# Patient Record
Sex: FEMALE | Race: WHITE | NOT HISPANIC OR LATINO | Employment: UNEMPLOYED | URBAN - METROPOLITAN AREA
[De-identification: names, ages, dates, MRNs, and addresses within clinical notes are randomized per-mention and may not be internally consistent; named-entity substitution may affect disease eponyms.]

---

## 2018-04-30 ENCOUNTER — LAB REQUISITION (OUTPATIENT)
Dept: LAB | Facility: HOSPITAL | Age: 23
End: 2018-04-30

## 2018-04-30 DIAGNOSIS — Z00.00 ROUTINE GENERAL MEDICAL EXAMINATION AT A HEALTH CARE FACILITY: ICD-10-CM

## 2018-04-30 PROCEDURE — 36415 COLL VENOUS BLD VENIPUNCTURE: CPT | Performed by: NURSE PRACTITIONER

## 2018-05-08 ENCOUNTER — LAB REQUISITION (OUTPATIENT)
Dept: LAB | Facility: HOSPITAL | Age: 23
End: 2018-05-08

## 2018-05-08 DIAGNOSIS — Z00.00 ROUTINE GENERAL MEDICAL EXAMINATION AT A HEALTH CARE FACILITY: ICD-10-CM

## 2018-05-08 PROCEDURE — 36415 COLL VENOUS BLD VENIPUNCTURE: CPT | Performed by: OBSTETRICS & GYNECOLOGY

## 2018-10-10 ENCOUNTER — LAB REQUISITION (OUTPATIENT)
Dept: LAB | Facility: HOSPITAL | Age: 23
End: 2018-10-10

## 2018-10-10 DIAGNOSIS — Z00.00 ROUTINE GENERAL MEDICAL EXAMINATION AT A HEALTH CARE FACILITY: ICD-10-CM

## 2018-10-10 PROCEDURE — 36415 COLL VENOUS BLD VENIPUNCTURE: CPT | Performed by: NURSE PRACTITIONER

## 2019-08-02 ENCOUNTER — HOSPITAL ENCOUNTER (EMERGENCY)
Facility: HOSPITAL | Age: 24
Discharge: HOME | End: 2019-08-02
Attending: EMERGENCY MEDICINE | Admitting: EMERGENCY MEDICINE

## 2019-08-02 VITALS
RESPIRATION RATE: 18 BRPM | OXYGEN SATURATION: 94 % | BODY MASS INDEX: 35.49 KG/M2 | HEIGHT: 72 IN | WEIGHT: 262 LBS | SYSTOLIC BLOOD PRESSURE: 123 MMHG | TEMPERATURE: 98.4 F | DIASTOLIC BLOOD PRESSURE: 66 MMHG | HEART RATE: 90 BPM

## 2019-08-02 DIAGNOSIS — N93.9 ABNORMAL VAGINAL BLEEDING: Primary | ICD-10-CM

## 2019-08-02 LAB
ALBUMIN SERPL-MCNC: 4.1 G/DL (ref 3.5–5.2)
ALBUMIN/GLOB SERPL: 1.3 G/DL
ALP SERPL-CCNC: 68 U/L (ref 39–117)
ALT SERPL W P-5'-P-CCNC: 28 U/L (ref 1–33)
ANION GAP SERPL CALCULATED.3IONS-SCNC: 14 MMOL/L (ref 5–15)
AST SERPL-CCNC: 20 U/L (ref 1–32)
B-HCG UR QL: NEGATIVE
BACTERIA UR QL AUTO: ABNORMAL /HPF
BASOPHILS # BLD AUTO: 0.02 10*3/MM3 (ref 0–0.2)
BASOPHILS NFR BLD AUTO: 0.3 % (ref 0–1.5)
BILIRUB SERPL-MCNC: 0.6 MG/DL (ref 0.2–1.2)
BILIRUB UR QL STRIP: NEGATIVE
BUN BLD-MCNC: 11 MG/DL (ref 6–20)
BUN/CREAT SERPL: 15.1 (ref 7–25)
CALCIUM SPEC-SCNC: 9.7 MG/DL (ref 8.6–10.5)
CHLORIDE SERPL-SCNC: 103 MMOL/L (ref 98–107)
CLARITY UR: CLEAR
CO2 SERPL-SCNC: 27 MMOL/L (ref 22–29)
COLOR UR: YELLOW
CREAT BLD-MCNC: 0.73 MG/DL (ref 0.57–1)
DEPRECATED RDW RBC AUTO: 40.5 FL (ref 37–54)
EOSINOPHIL # BLD AUTO: 0.09 10*3/MM3 (ref 0–0.4)
EOSINOPHIL NFR BLD AUTO: 1.2 % (ref 0.3–6.2)
ERYTHROCYTE [DISTWIDTH] IN BLOOD BY AUTOMATED COUNT: 13.2 % (ref 12.3–15.4)
GFR SERPL CREATININE-BSD FRML MDRD: 97 ML/MIN/1.73
GLOBULIN UR ELPH-MCNC: 3.1 GM/DL
GLUCOSE BLD-MCNC: 118 MG/DL (ref 65–99)
GLUCOSE UR STRIP-MCNC: NEGATIVE MG/DL
HCT VFR BLD AUTO: 41.9 % (ref 34–46.6)
HGB BLD-MCNC: 13.3 G/DL (ref 12–15.9)
HGB UR QL STRIP.AUTO: ABNORMAL
HOLD SPECIMEN: NORMAL
HOLD SPECIMEN: NORMAL
HYALINE CASTS UR QL AUTO: ABNORMAL /LPF
IMM GRANULOCYTES # BLD AUTO: 0.03 10*3/MM3 (ref 0–0.05)
IMM GRANULOCYTES NFR BLD AUTO: 0.4 % (ref 0–0.5)
INTERNAL NEGATIVE CONTROL: NEGATIVE
INTERNAL POSITIVE CONTROL: POSITIVE
KETONES UR QL STRIP: NEGATIVE
LEUKOCYTE ESTERASE UR QL STRIP.AUTO: NEGATIVE
LYMPHOCYTES # BLD AUTO: 2.91 10*3/MM3 (ref 0.7–3.1)
LYMPHOCYTES NFR BLD AUTO: 39.9 % (ref 19.6–45.3)
Lab: NORMAL
MCH RBC QN AUTO: 26.9 PG (ref 26.6–33)
MCHC RBC AUTO-ENTMCNC: 31.7 G/DL (ref 31.5–35.7)
MCV RBC AUTO: 84.8 FL (ref 79–97)
MONOCYTES # BLD AUTO: 0.58 10*3/MM3 (ref 0.1–0.9)
MONOCYTES NFR BLD AUTO: 8 % (ref 5–12)
NEUTROPHILS # BLD AUTO: 3.66 10*3/MM3 (ref 1.7–7)
NEUTROPHILS NFR BLD AUTO: 50.2 % (ref 42.7–76)
NITRITE UR QL STRIP: NEGATIVE
NRBC BLD AUTO-RTO: 0 /100 WBC (ref 0–0.2)
PH UR STRIP.AUTO: 6 [PH] (ref 5–8)
PLATELET # BLD AUTO: 233 10*3/MM3 (ref 140–450)
PMV BLD AUTO: 10.4 FL (ref 6–12)
POTASSIUM BLD-SCNC: 3.7 MMOL/L (ref 3.5–5.2)
PROT SERPL-MCNC: 7.2 G/DL (ref 6–8.5)
PROT UR QL STRIP: ABNORMAL
RBC # BLD AUTO: 4.94 10*6/MM3 (ref 3.77–5.28)
RBC # UR: ABNORMAL /HPF
REF LAB TEST METHOD: ABNORMAL
SODIUM BLD-SCNC: 144 MMOL/L (ref 136–145)
SP GR UR STRIP: 1.02 (ref 1–1.03)
SQUAMOUS #/AREA URNS HPF: ABNORMAL /HPF
UROBILINOGEN UR QL STRIP: ABNORMAL
WBC NRBC COR # BLD: 7.29 10*3/MM3 (ref 3.4–10.8)
WBC UR QL AUTO: ABNORMAL /HPF
WHOLE BLOOD HOLD SPECIMEN: NORMAL
WHOLE BLOOD HOLD SPECIMEN: NORMAL

## 2019-08-02 PROCEDURE — 80053 COMPREHEN METABOLIC PANEL: CPT | Performed by: PHYSICIAN ASSISTANT

## 2019-08-02 PROCEDURE — 85025 COMPLETE CBC W/AUTO DIFF WBC: CPT | Performed by: PHYSICIAN ASSISTANT

## 2019-08-02 PROCEDURE — 99284 EMERGENCY DEPT VISIT MOD MDM: CPT

## 2019-08-02 PROCEDURE — 81001 URINALYSIS AUTO W/SCOPE: CPT | Performed by: PHYSICIAN ASSISTANT

## 2019-08-02 PROCEDURE — 81025 URINE PREGNANCY TEST: CPT | Performed by: PHYSICIAN ASSISTANT

## 2019-08-02 NOTE — ED PROVIDER NOTES
Subjective   Syd Davalos is a 24 y.o. female who presents to the ED with complaints of vaginal bleeding. The patient reports that she had a vaginal delivery of her second child on 12/8/18. She states that she experienced vaginal bleeding shortly after the surgery, but did not start bleeding again until 2 months ago. She states that she stopped breast feeding approximately 3-4 months ago. She reports that her vaginal bleeding has worsened over the past few months and last week she began to experience clots. She states that the clots have gradually been increasing in size. She notes that this morning she woke up and she had bled through her tampon, her pants, and onto her bed. She states that she had passed a clot that was tangerine sized. She reports that she goes through approximately 1 tampon an hour. She also complains of nausea, vomiting, weakness, and occasional abdominal pain. She denies dizziness, syncope, and dysuria. She has an appointment scheduled with her obgyn on 8/8. She is not taking birth control and is sexually active. She has a history of asthma. She smokes tobacco. There are no other acute complaints at this time.         History provided by:  Patient  Vaginal Bleeding   Severity:  Moderate  Onset quality:  Gradual  Timing:  Constant  Progression:  Worsening  Chronicity:  New  Associated symptoms: abdominal pain and nausea    Associated symptoms: no back pain, no dizziness, no dysuria and no fever        Review of Systems   Constitutional: Negative for chills and fever.   HENT: Negative for congestion, ear pain, sore throat and trouble swallowing.    Eyes: Negative for pain, redness and visual disturbance.   Respiratory: Negative for cough, chest tightness and shortness of breath.    Cardiovascular: Negative for chest pain and leg swelling.   Gastrointestinal: Positive for abdominal pain, nausea and vomiting. Negative for constipation and diarrhea.   Genitourinary: Positive for vaginal bleeding.  Negative for difficulty urinating, dysuria, flank pain and hematuria.   Musculoskeletal: Negative for arthralgias, back pain and joint swelling.   Skin: Negative for rash and wound.   Neurological: Positive for weakness. Negative for dizziness, syncope, speech difficulty, numbness and headaches.   Psychiatric/Behavioral: Negative for confusion.   All other systems reviewed and are negative.      Past Medical History:   Diagnosis Date   • Asthma        No Known Allergies    History reviewed. No pertinent surgical history.    History reviewed. No pertinent family history.    Social History     Socioeconomic History   • Marital status: Single     Spouse name: Not on file   • Number of children: Not on file   • Years of education: Not on file   • Highest education level: Not on file   Tobacco Use   • Smoking status: Current Every Day Smoker   Substance and Sexual Activity   • Alcohol use: No   • Drug use: Yes     Types: Marijuana   • Sexual activity: Yes     Partners: Male     Birth control/protection: None         Objective   Physical Exam   Constitutional: She is oriented to person, place, and time. She appears well-developed and well-nourished. No distress.   HENT:   Head: Normocephalic and atraumatic.   Eyes: Conjunctivae are normal. No scleral icterus.   Neck: Normal range of motion. Neck supple.   Cardiovascular: Normal rate, regular rhythm and normal heart sounds.   Pulmonary/Chest: Effort normal and breath sounds normal.   Abdominal: Soft. There is tenderness. There is no rebound and no guarding.   Mild diffuse lower abdominal tenderness.    Genitourinary: There is bleeding (mild) in the vagina. No erythema or tenderness in the vagina. No vaginal discharge found.   Genitourinary Comments: Nurse chaperone present.    Musculoskeletal: Normal range of motion.   Neurological: She is alert and oriented to person, place, and time.   Skin: Skin is warm and dry.   Psychiatric: She has a normal mood and affect. Her  behavior is normal.   Nursing note and vitals reviewed.      Procedures         ED Course     Discussed results with patient and tx plan. Labs WNL, patient is not anemic and vitals stable. Will dc home and have her f/u with OB as scheduled.      Recent Results (from the past 24 hour(s))   Light Blue Top    Collection Time: 08/02/19  2:40 PM   Result Value Ref Range    Extra Tube hold for add-on    Green Top (Gel)    Collection Time: 08/02/19  2:40 PM   Result Value Ref Range    Extra Tube Hold for add-ons.    Lavender Top    Collection Time: 08/02/19  2:40 PM   Result Value Ref Range    Extra Tube hold for add-on    Gold Top - SST    Collection Time: 08/02/19  2:40 PM   Result Value Ref Range    Extra Tube Hold for add-ons.    Comprehensive Metabolic Panel    Collection Time: 08/02/19  2:40 PM   Result Value Ref Range    Glucose 118 (H) 65 - 99 mg/dL    BUN 11 6 - 20 mg/dL    Creatinine 0.73 0.57 - 1.00 mg/dL    Sodium 144 136 - 145 mmol/L    Potassium 3.7 3.5 - 5.2 mmol/L    Chloride 103 98 - 107 mmol/L    CO2 27.0 22.0 - 29.0 mmol/L    Calcium 9.7 8.6 - 10.5 mg/dL    Total Protein 7.2 6.0 - 8.5 g/dL    Albumin 4.10 3.50 - 5.20 g/dL    ALT (SGPT) 28 1 - 33 U/L    AST (SGOT) 20 1 - 32 U/L    Alkaline Phosphatase 68 39 - 117 U/L    Total Bilirubin 0.6 0.2 - 1.2 mg/dL    eGFR Non African Amer 97 >60 mL/min/1.73    Globulin 3.1 gm/dL    A/G Ratio 1.3 g/dL    BUN/Creatinine Ratio 15.1 7.0 - 25.0    Anion Gap 14.0 5.0 - 15.0 mmol/L   CBC Auto Differential    Collection Time: 08/02/19  2:40 PM   Result Value Ref Range    WBC 7.29 3.40 - 10.80 10*3/mm3    RBC 4.94 3.77 - 5.28 10*6/mm3    Hemoglobin 13.3 12.0 - 15.9 g/dL    Hematocrit 41.9 34.0 - 46.6 %    MCV 84.8 79.0 - 97.0 fL    MCH 26.9 26.6 - 33.0 pg    MCHC 31.7 31.5 - 35.7 g/dL    RDW 13.2 12.3 - 15.4 %    RDW-SD 40.5 37.0 - 54.0 fl    MPV 10.4 6.0 - 12.0 fL    Platelets 233 140 - 450 10*3/mm3    Neutrophil % 50.2 42.7 - 76.0 %    Lymphocyte % 39.9 19.6 - 45.3 %     Monocyte % 8.0 5.0 - 12.0 %    Eosinophil % 1.2 0.3 - 6.2 %    Basophil % 0.3 0.0 - 1.5 %    Immature Grans % 0.4 0.0 - 0.5 %    Neutrophils, Absolute 3.66 1.70 - 7.00 10*3/mm3    Lymphocytes, Absolute 2.91 0.70 - 3.10 10*3/mm3    Monocytes, Absolute 0.58 0.10 - 0.90 10*3/mm3    Eosinophils, Absolute 0.09 0.00 - 0.40 10*3/mm3    Basophils, Absolute 0.02 0.00 - 0.20 10*3/mm3    Immature Grans, Absolute 0.03 0.00 - 0.05 10*3/mm3    nRBC 0.0 0.0 - 0.2 /100 WBC   Urinalysis With Microscopic If Indicated (No Culture) - Urine, Clean Catch    Collection Time: 08/02/19  3:19 PM   Result Value Ref Range    Color, UA Yellow Yellow, Straw    Appearance, UA Clear Clear    pH, UA 6.0 5.0 - 8.0    Specific Gravity, UA 1.022 1.001 - 1.030    Glucose, UA Negative Negative    Ketones, UA Negative Negative    Bilirubin, UA Negative Negative    Blood, UA Moderate (2+) (A) Negative    Protein, UA 30 mg/dL (1+) (A) Negative    Leuk Esterase, UA Negative Negative    Nitrite, UA Negative Negative    Urobilinogen, UA 1.0 E.U./dL 0.2 - 1.0 E.U./dL   Urinalysis, Microscopic Only - Urine, Clean Catch    Collection Time: 08/02/19  3:19 PM   Result Value Ref Range    RBC, UA 21-30 (A) None Seen, 0-2 /HPF    WBC, UA 0-2 None Seen, 0-2 /HPF    Bacteria, UA None Seen None Seen, Trace /HPF    Squamous Epithelial Cells, UA 0-2 None Seen, 0-2 /HPF    Hyaline Casts, UA 0-6 0 - 6 /LPF    Methodology Automated Microscopy    POCT Pregnancy, Urine    Collection Time: 08/02/19  3:20 PM   Result Value Ref Range    HCG, Urine, QL Negative Negative    Lot Number [id number]     Internal Positive Control Positive     Internal Negative Control Negative      Note: In addition to lab results from this visit, the labs listed above may include labs taken at another facility or during a different encounter within the last 24 hours. Please correlate lab times with ED admission and discharge times for further clarification of the services performed during this  "visit.    No orders to display     Vitals:    08/02/19 1412   BP: 123/66   BP Location: Left arm   Patient Position: Sitting   Pulse: 90   Resp: 18   Temp: 98.4 °F (36.9 °C)   TempSrc: Oral   SpO2: 94%   Weight: 119 kg (262 lb)   Height: 182.9 cm (72\")     Medications - No data to display  ECG/EMG Results (last 24 hours)     ** No results found for the last 24 hours. **        No orders to display                     MDM    Final diagnoses:   Abnormal vaginal bleeding       Documentation assistance provided by andrews Pascal.  Information recorded by the andrews was done at my direction and has been verified and validated by me.     Vane Pascal  08/02/19 1743       Vane Pascal  08/02/19 1743       Sharlene Arredondo PA  08/02/19 0477    "

## 2019-10-21 ENCOUNTER — OFFICE VISIT (OUTPATIENT)
Dept: FAMILY MEDICINE CLINIC | Facility: CLINIC | Age: 24
End: 2019-10-21

## 2019-10-21 VITALS
SYSTOLIC BLOOD PRESSURE: 124 MMHG | HEART RATE: 89 BPM | TEMPERATURE: 97.3 F | DIASTOLIC BLOOD PRESSURE: 62 MMHG | WEIGHT: 253 LBS | OXYGEN SATURATION: 97 % | HEIGHT: 72 IN | BODY MASS INDEX: 34.27 KG/M2 | RESPIRATION RATE: 16 BRPM

## 2019-10-21 DIAGNOSIS — K21.9 GERD WITHOUT ESOPHAGITIS: ICD-10-CM

## 2019-10-21 DIAGNOSIS — K52.9 CHRONIC DIARRHEA: ICD-10-CM

## 2019-10-21 DIAGNOSIS — Z83.3 FAMILY HISTORY OF DIABETES MELLITUS: ICD-10-CM

## 2019-10-21 DIAGNOSIS — J45.20 MILD INTERMITTENT ASTHMA WITHOUT COMPLICATION: Primary | ICD-10-CM

## 2019-10-21 DIAGNOSIS — F17.200 CURRENT SMOKER: ICD-10-CM

## 2019-10-21 PROCEDURE — 99406 BEHAV CHNG SMOKING 3-10 MIN: CPT | Performed by: FAMILY MEDICINE

## 2019-10-21 PROCEDURE — 99204 OFFICE O/P NEW MOD 45 MIN: CPT | Performed by: FAMILY MEDICINE

## 2019-10-21 RX ORDER — FAMOTIDINE 20 MG/1
20 TABLET, FILM COATED ORAL 2 TIMES DAILY PRN
Qty: 180 TABLET | Refills: 1 | Status: SHIPPED | OUTPATIENT
Start: 2019-10-21 | End: 2020-04-16 | Stop reason: SDUPTHER

## 2019-10-21 RX ORDER — ALBUTEROL SULFATE 90 UG/1
2 AEROSOL, METERED RESPIRATORY (INHALATION) EVERY 6 HOURS PRN
Qty: 1 G | Refills: 6 | Status: SHIPPED | OUTPATIENT
Start: 2019-10-21 | End: 2020-05-27

## 2019-10-21 RX ORDER — BUPROPION HYDROCHLORIDE 150 MG/1
TABLET, EXTENDED RELEASE ORAL
Qty: 180 TABLET | Refills: 1 | Status: SHIPPED | OUTPATIENT
Start: 2019-10-21

## 2019-10-21 RX ORDER — DICYCLOMINE HCL 20 MG
20 TABLET ORAL EVERY 6 HOURS
Qty: 120 TABLET | Refills: 1 | Status: SHIPPED | OUTPATIENT
Start: 2019-10-21 | End: 2019-12-16 | Stop reason: SDUPTHER

## 2019-10-21 NOTE — PATIENT INSTRUCTIONS
Go to the nearest ER or return to clinic if symptoms worsen, fever/chill develop    Diarrhea, Adult  Diarrhea is when you have loose and water poop (stool) often. Diarrhea can make you feel weak and cause you to get dehydrated. Dehydration can make you tired and thirsty, make you have a dry mouth, and make it so you pee (urinate) less often. Diarrhea often lasts 2-3 days. However, it can last longer if it is a sign of something more serious. It is important to treat your diarrhea as told by your doctor.  Follow these instructions at home:  Eating and drinking  Follow these recommendations as told by your doctor:  · Take an oral rehydration solution (ORS). This is a drink that is sold at pharmacies and stores.  · Drink clear fluids, such as:  ? Water.  ? Ice chips.  ? Diluted fruit juice.  ? Low-calorie sports drinks.  · Eat bland, easy-to-digest foods in small amounts as you are able. These foods include:  ? Bananas.  ? Applesauce.  ? Rice.  ? Low-fat (lean) meats.  ? Toast.  ? Crackers.  · Avoid drinking fluids that have a lot of sugar or caffeine in them.  · Avoid alcohol.  · Avoid spicy or fatty foods.    General instructions  · Drink enough fluid to keep your pee (urine) clear or pale yellow.  · Wash your hands often. If you cannot use soap and water, use hand .  · Make sure that all people in your home wash their hands well and often.  · Take over-the-counter and prescription medicines only as told by your doctor.  · Rest at home while you get better.  · Watch your condition for any changes.  · Take a warm bath to help with any burning or pain from having diarrhea.  · Keep all follow-up visits as told by your doctor. This is important.  Contact a doctor if:  · You have a fever.  · Your diarrhea gets worse.  · You have new symptoms.  · You cannot keep fluids down.  · You feel light-headed or dizzy.  · You have a headache.  · You have muscle cramps.  Get help right away if:  · You have chest pain.  · You  feel very weak or you pass out (faint).  · You have bloody or black poop or poop that look like tar.  · You have very bad pain, cramping, or bloating in your belly (abdomen).  · You have trouble breathing or you are breathing very quickly.  · Your heart is beating very quickly.  · Your skin feels cold and clammy.  · You feel confused.  · You have signs of dehydration, such as:  ? Dark pee, hardly any pee, or no pee.  ? Cracked lips.  ? Dry mouth.  ? Sunken eyes.  ? Sleepiness.  ? Weakness.  This information is not intended to replace advice given to you by your health care provider. Make sure you discuss any questions you have with your health care provider.  Document Released: 06/05/2009 Document Revised: 08/22/2018 Document Reviewed: 08/23/2016  [employer] Interactive Patient Education © 2019 [employer]

## 2019-10-21 NOTE — PROGRESS NOTES
Subjective   Syd Davalos is a 24 y.o. female.   Chief Complaint   Patient presents with   • Establish Care   • Heartburn   • Diarrhea     History of Present Illness   Here to establish care  No recent primary care.     She does have asthma and smokes tobacco.   Previously treated with albuterol prn, but doesn't have an active inhaler.   Asthma has worsened with season change. Chest tightness and wheezing present.   Currently smokes <1/2 ppd, but can't seem to completely quit. She is interested in quitting.     Diarrhea present for years. Diarrhea is watery, no solid form.   No specific food seems to trigger symptoms.   No abdominal pain associated with symptoms.   Diarrhea occurs as soon as she wakes up in the morning.   Tried probiotics for months without improvement.    GERD  Patient complains of dyspepsia. Symptoms have been present for approximately for 1.5 year. Symptoms include belching, difficulty swallowing, heartburn and need to clear throat frequently. The patient denies chest pain and melena. Symptoms appear to be worsened by caffeine, carbonated beverages and citrus juices. Risk factors present for GERD include tobacco abuse, caffeine use and obesity. Risk factors absent for GERD are NSAID use. Studies performed so far include none. Treatments tried so far include chewable antacid. Results of treatment: some improvement in symptom severity. Currently, the symptoms are moderate and occur approximately 1 times per day.     She worried about DM, family history.  Also, admits to poor diet.     The following portions of the patient's history were reviewed and updated as appropriate: allergies, current medications, past family history, past medical history, past social history, past surgical history and problem list.    Review of Systems   Constitutional: Negative for chills and fever.   HENT: Negative.    Eyes: Negative.    Respiratory: Positive for chest tightness and wheezing. Negative for cough and  shortness of breath.    Cardiovascular: Negative for chest pain and palpitations.   Gastrointestinal: Positive for diarrhea, nausea and GERD. Negative for abdominal distention, abdominal pain, blood in stool and vomiting.   Endocrine: Negative for cold intolerance and heat intolerance.   Skin: Negative for rash.   Neurological: Negative for light-headedness and headache.   Hematological: Negative for adenopathy.   Psychiatric/Behavioral: Negative.        Objective   Physical Exam   Constitutional: She is oriented to person, place, and time. She appears well-developed and well-nourished.   HENT:   Head: Normocephalic and atraumatic.   Right Ear: External ear normal.   Left Ear: External ear normal.   Nose: Nose normal.   Eyes: Conjunctivae are normal.   Neck: Neck supple.   Cardiovascular: Normal rate, regular rhythm and normal heart sounds.   No murmur heard.  Pulmonary/Chest: Effort normal. No respiratory distress. She has wheezes (late expiratory) in the right upper field and the left upper field. She has no rhonchi.   Abdominal: Soft. Bowel sounds are normal. There is no tenderness. There is no guarding.   Musculoskeletal: She exhibits no edema.   Lymphadenopathy:     She has no cervical adenopathy.   Neurological: She is alert and oriented to person, place, and time.   Skin: Skin is warm and dry.   Psychiatric: She has a normal mood and affect. Her behavior is normal. Thought content normal.   Nursing note and vitals reviewed.        Assessment/Plan   Syd was seen today for establish care, heartburn and diarrhea.    Diagnoses and all orders for this visit:    Mild intermittent asthma without complication  -     albuterol sulfate  (90 Base) MCG/ACT inhaler; Inhale 2 puffs Every 6 (Six) Hours As Needed for Wheezing or Shortness of Air.  -     fluticasone-salmeterol (ADVAIR) 250-50 MCG/DOSE DISKUS; Inhale 1 puff 2 (Two) Times a Day.  -     CBC & Differential  -     Comprehensive Metabolic Panel  -     TSH  Rfx On Abnormal To Free T4    Current smoker  -     buPROPion SR (WELLBUTRIN SR) 150 MG 12 hr tablet; Take 1 tab po qd x 3 days, then increase to 1 tab po BID  -     CBC & Differential  -     Comprehensive Metabolic Panel  -     TSH Rfx On Abnormal To Free T4    Chronic diarrhea  -     CBC & Differential  -     Comprehensive Metabolic Panel  -     TSH Rfx On Abnormal To Free T4  -     Celiac Comprehensive Panel  -     dicyclomine (BENTYL) 20 MG tablet; Take 1 tablet by mouth Every 6 (Six) Hours.    GERD without esophagitis  -     famotidine (PEPCID) 20 MG tablet; Take 1 tablet by mouth 2 (Two) Times a Day As Needed for Indigestion or Heartburn.    Family history of diabetes mellitus  -     Hemoglobin A1c      She will start Advair to control asthma, albuterol inhaler prn.   She is interested in tobacco cessation. I advised Syd of the risks of continuing to use tobacco. During this visit, I spent 4 minutes counseling the patient regarding tobacco cessation.  Start bupropion first. Advised her to not start all medications at same time, in case side effects occur. Separate bupropion, famotidine, and dicyclomine by 1 week a piece.   Return to complete labs.

## 2019-11-22 ENCOUNTER — TELEPHONE (OUTPATIENT)
Dept: FAMILY MEDICINE CLINIC | Facility: CLINIC | Age: 24
End: 2019-11-22

## 2019-11-22 RX ORDER — CIPROFLOXACIN HYDROCHLORIDE 3.5 MG/ML
SOLUTION/ DROPS TOPICAL
Qty: 5 ML | Refills: 0 | Status: SHIPPED | OUTPATIENT
Start: 2019-11-22

## 2019-11-22 NOTE — TELEPHONE ENCOUNTER
PT  CALLED  STATES SHE HAS PINK EYE DUE TO THERE IS A 4YR OLD CHILD IN HER HOME  THAT HAS BEEN DIAGNOSED WITH IT  SHE WOULD LIKE SOME PINK EYE MEDS CALLED IN TO [facility]     PLEASE ADVISE AND GIVE CALL BACK

## 2019-11-22 NOTE — TELEPHONE ENCOUNTER
Antibiotic eyedrops sent to pharmacy.  Encouraged her to use good hand hygiene to prevent spread.  If symptoms worsen over the weekend, recommend that she go to urgent treatment center or ER for further evaluation

## 2019-12-16 DIAGNOSIS — K52.9 CHRONIC DIARRHEA: ICD-10-CM

## 2019-12-16 RX ORDER — DICYCLOMINE HCL 20 MG
TABLET ORAL
Qty: 120 TABLET | Refills: 1 | Status: SHIPPED | OUTPATIENT
Start: 2019-12-16

## 2020-04-16 DIAGNOSIS — J45.20 MILD INTERMITTENT ASTHMA WITHOUT COMPLICATION: ICD-10-CM

## 2020-04-16 RX ORDER — FAMOTIDINE 40 MG/1
TABLET, FILM COATED ORAL
Qty: 30 TABLET | Refills: 1 | Status: SHIPPED | OUTPATIENT
Start: 2020-04-16 | End: 2020-06-26

## 2020-05-27 DIAGNOSIS — J45.20 MILD INTERMITTENT ASTHMA WITHOUT COMPLICATION: ICD-10-CM

## 2020-05-27 RX ORDER — ALBUTEROL SULFATE 90 UG/1
2 AEROSOL, METERED RESPIRATORY (INHALATION) EVERY 6 HOURS PRN
Qty: 8.5 G | Refills: 2 | Status: SHIPPED | OUTPATIENT
Start: 2020-05-27 | End: 2020-09-11

## 2020-06-26 RX ORDER — FAMOTIDINE 40 MG/1
TABLET, FILM COATED ORAL
Qty: 30 TABLET | Refills: 0 | Status: SHIPPED | OUTPATIENT
Start: 2020-06-26 | End: 2020-08-18

## 2020-08-18 RX ORDER — FAMOTIDINE 40 MG/1
TABLET, FILM COATED ORAL
Qty: 30 TABLET | Refills: 0 | Status: SHIPPED | OUTPATIENT
Start: 2020-08-18

## 2020-09-11 DIAGNOSIS — J45.20 MILD INTERMITTENT ASTHMA WITHOUT COMPLICATION: ICD-10-CM

## 2020-09-11 RX ORDER — ALBUTEROL SULFATE 90 UG/1
2 AEROSOL, METERED RESPIRATORY (INHALATION) EVERY 6 HOURS PRN
Qty: 8.5 G | Refills: 0 | Status: SHIPPED | OUTPATIENT
Start: 2020-09-11

## 2020-09-11 NOTE — TELEPHONE ENCOUNTER
Patient has not been seen since 10/21/2019, return appointment was supposed to be completed on 12/02/2019. Please advise on refill.

## 2020-09-25 RX ORDER — FAMOTIDINE 40 MG/1
TABLET, FILM COATED ORAL
Qty: 30 TABLET | Refills: 0 | OUTPATIENT
Start: 2020-09-25

## 2020-10-22 DIAGNOSIS — J45.20 MILD INTERMITTENT ASTHMA WITHOUT COMPLICATION: ICD-10-CM

## 2020-12-31 ENCOUNTER — OFFICE VISIT (OUTPATIENT)
Dept: FAMILY MEDICINE CLINIC | Facility: CLINIC | Age: 25
End: 2020-12-31

## 2020-12-31 VITALS
RESPIRATION RATE: 16 BRPM | TEMPERATURE: 97.5 F | OXYGEN SATURATION: 99 % | SYSTOLIC BLOOD PRESSURE: 122 MMHG | HEART RATE: 95 BPM | DIASTOLIC BLOOD PRESSURE: 70 MMHG | BODY MASS INDEX: 35.67 KG/M2 | WEIGHT: 263 LBS

## 2020-12-31 DIAGNOSIS — J02.9 PHARYNGITIS, UNSPECIFIED ETIOLOGY: Primary | ICD-10-CM

## 2020-12-31 LAB
EXPIRATION DATE: NORMAL
INTERNAL CONTROL: NORMAL
Lab: NORMAL
S PYO AG THROAT QL: NEGATIVE

## 2020-12-31 PROCEDURE — 87880 STREP A ASSAY W/OPTIC: CPT | Performed by: FAMILY MEDICINE

## 2020-12-31 PROCEDURE — 99213 OFFICE O/P EST LOW 20 MIN: CPT | Performed by: FAMILY MEDICINE

## 2020-12-31 RX ORDER — HYDROXYZINE PAMOATE 50 MG/1
50 CAPSULE ORAL 3 TIMES DAILY PRN
COMMUNITY

## 2020-12-31 NOTE — PROGRESS NOTES
Assessment/Plan       Diagnoses and all orders for this visit:    1. Pharyngitis, unspecified etiology (Primary)  -     COVID-19,[facility] ROUTINE, NP/OP SWAB IN TRANSPORT MEDIA OR ESWAB 72 HR TAT - Swab, Nasopharynx  -     POCT rapid strep A           Follow up: No follow-ups on file.     DISCUSSION  Sore throat.  Needs further clearance for surgery.  Check strep test and Covid testing.    Symptomatic relief if negative.    Addendum: The strep test was negative.  Covid pending.          MEDICATIONS PRESCRIBED  Requested Prescriptions      No prescriptions requested or ordered in this encounter          -------------------------------------------    Subjective     Chief Complaint   Patient presents with   • Sore Throat   • Diarrhea         Sore Throat   This is a new problem. The current episode started yesterday. The problem has been unchanged. There has been no fever. Associated symptoms include congestion (mild) and diarrhea. Pertinent negatives include no headaches or shortness of breath. She has had no exposure to strep or mono. She has tried nothing for the symptoms.     + taste and smell    Some mucus      Nop fever    Some cough but smokes and has asthma    Was scheduled for surg yesterday at noon  Woke up with a sore throat, Severe  H/o     R/s surg. Need plate and screw in hand, right hand fractured  No date        Social History     Tobacco Use   Smoking Status Current Every Day Smoker          Past Medical History,Medications, Allergies, and social history was reviewed.          Review of Systems   Constitutional: Positive for fever.   HENT: Positive for congestion (mild) and sore throat.    Respiratory: Negative for shortness of breath.    Gastrointestinal: Positive for diarrhea.       Objective     Vitals:    12/31/20 1158   BP: 122/70   Pulse: 95   Resp: 16   Temp: 97.5 °F (36.4 °C)   SpO2: 99%   Weight: 119 kg (263 lb)          Physical Exam  Vitals signs and nursing note reviewed.   Constitutional:        Appearance: She is well-developed.   HENT:      Head: Normocephalic and atraumatic.      Right Ear: Hearing, tympanic membrane, ear canal and external ear normal. There is no impacted cerumen.      Left Ear: Hearing, tympanic membrane, ear canal and external ear normal. There is no impacted cerumen.      Mouth/Throat:      Mouth: Mucous membranes are moist.      Pharynx: Posterior oropharyngeal erythema ( Mild) present. No oropharyngeal exudate.   Eyes:      Conjunctiva/sclera: Conjunctivae normal.      Pupils: Pupils are equal, round, and reactive to light.   Cardiovascular:      Rate and Rhythm: Normal rate and regular rhythm.      Heart sounds: Normal heart sounds. No murmur. No friction rub.   Pulmonary:      Effort: Pulmonary effort is normal. No respiratory distress.      Breath sounds: Normal breath sounds. No wheezing or rales.   Skin:     General: Skin is warm.   Neurological:      Mental Status: She is alert and oriented to person, place, and time.   Psychiatric:         Behavior: Behavior normal.                     Rolando Bucio MD

## 2021-01-02 LAB — SARS-COV-2 RNA RESP QL NAA+PROBE: NOT DETECTED

## 2021-11-11 ENCOUNTER — TELEPHONE (OUTPATIENT)
Dept: FAMILY MEDICINE CLINIC | Facility: CLINIC | Age: 26
End: 2021-11-11

## 2021-11-11 NOTE — TELEPHONE ENCOUNTER
Advised patient she needed to schedule an appointment offered to schedule her and she hung up on me.

## 2021-11-11 NOTE — TELEPHONE ENCOUNTER
Caller: Syd Davalos    Relationship: Self    Best call back number: [phone]     What medication are you requesting: SOMETHING TO TREAT CHLAMYDIA    If a prescription is needed, what is your preferred pharmacy and phone number: [facility] - [address] - [phone]  - [phone]     Additional notes: PATIENT STATED THAT HER SEXUAL ENCOUNTER WAS DIAGNOSED WITH CHLAMYDIA